# Patient Record
Sex: FEMALE | Race: WHITE
[De-identification: names, ages, dates, MRNs, and addresses within clinical notes are randomized per-mention and may not be internally consistent; named-entity substitution may affect disease eponyms.]

---

## 2019-10-28 ENCOUNTER — HOSPITAL ENCOUNTER (EMERGENCY)
Dept: HOSPITAL 88 - ER | Age: 69
Discharge: HOME | End: 2019-10-28
Payer: MEDICARE

## 2019-10-28 VITALS — HEIGHT: 68 IN | WEIGHT: 224 LBS | BODY MASS INDEX: 33.95 KG/M2

## 2019-10-28 DIAGNOSIS — R11.2: ICD-10-CM

## 2019-10-28 DIAGNOSIS — R19.7: ICD-10-CM

## 2019-10-28 DIAGNOSIS — N39.0: ICD-10-CM

## 2019-10-28 DIAGNOSIS — R10.84: Primary | ICD-10-CM

## 2019-10-28 LAB
ALBUMIN SERPL-MCNC: 3.8 G/DL (ref 3.5–5)
ALBUMIN/GLOB SERPL: 0.9 {RATIO} (ref 0.8–2)
ALP SERPL-CCNC: 95 IU/L (ref 40–150)
ALT SERPL-CCNC: 18 IU/L (ref 0–55)
ANION GAP SERPL CALC-SCNC: 14.4 MMOL/L (ref 8–16)
BACTERIA URNS QL MICRO: (no result) /HPF
BASOPHILS # BLD AUTO: 0 10*3/UL (ref 0–0.1)
BASOPHILS NFR BLD AUTO: 0.1 % (ref 0–1)
BILIRUB UR QL: NEGATIVE
BUN SERPL-MCNC: 42 MG/DL (ref 7–26)
BUN/CREAT SERPL: 20 (ref 6–25)
CALCIUM SERPL-MCNC: 8.9 MG/DL (ref 8.4–10.2)
CHLORIDE SERPL-SCNC: 104 MMOL/L (ref 98–107)
CK MB SERPL-MCNC: < 1 NG/ML (ref 0–4.3)
CK SERPL-CCNC: 150 IU/L (ref 29–168)
CLARITY UR: (no result)
CO2 SERPL-SCNC: 22 MMOL/L (ref 22–29)
COLOR UR: YELLOW
DEPRECATED NEUTROPHILS # BLD AUTO: 10.9 10*3/UL (ref 2.1–6.9)
DEPRECATED RBC URNS MANUAL-ACNC: (no result) /HPF (ref 0–5)
EGFRCR SERPLBLD CKD-EPI 2021: 23 ML/MIN (ref 60–?)
EOSINOPHIL # BLD AUTO: 0.1 10*3/UL (ref 0–0.4)
EOSINOPHIL NFR BLD AUTO: 0.6 % (ref 0–6)
EPI CELLS URNS QL MICRO: (no result) /LPF
ERYTHROCYTE [DISTWIDTH] IN CORD BLOOD: 12.5 % (ref 11.7–14.4)
GLOBULIN PLAS-MCNC: 4.1 G/DL (ref 2.3–3.5)
GLUCOSE SERPLBLD-MCNC: 203 MG/DL (ref 74–118)
HCT VFR BLD AUTO: 40.5 % (ref 34.2–44.1)
HGB BLD-MCNC: 13.2 G/DL (ref 12–16)
KETONES UR QL STRIP.AUTO: NEGATIVE
LEUKOCYTE ESTERASE UR QL STRIP.AUTO: (no result)
LYMPHOCYTES # BLD: 1.4 10*3/UL (ref 1–3.2)
LYMPHOCYTES NFR BLD AUTO: 10.9 % (ref 18–39.1)
MCH RBC QN AUTO: 30.5 PG (ref 28–32)
MCHC RBC AUTO-ENTMCNC: 32.6 G/DL (ref 31–35)
MCV RBC AUTO: 93.5 FL (ref 81–99)
MONOCYTES # BLD AUTO: 0.5 10*3/UL (ref 0.2–0.8)
MONOCYTES NFR BLD AUTO: 3.8 % (ref 4.4–11.3)
NEUTS SEG NFR BLD AUTO: 84.3 % (ref 38.7–80)
NITRITE UR QL STRIP.AUTO: NEGATIVE
PLATELET # BLD AUTO: 221 X10E3/UL (ref 140–360)
POTASSIUM SERPL-SCNC: 4.4 MMOL/L (ref 3.5–5.1)
PROT UR QL STRIP.AUTO: NEGATIVE
RBC # BLD AUTO: 4.33 X10E6/UL (ref 3.6–5.1)
SODIUM SERPL-SCNC: 136 MMOL/L (ref 136–145)
SP GR UR STRIP: 1.02 (ref 1.01–1.02)
UROBILINOGEN UR STRIP-MCNC: 0.2 MG/DL (ref 0.2–1)

## 2019-10-28 PROCEDURE — 84484 ASSAY OF TROPONIN QUANT: CPT

## 2019-10-28 PROCEDURE — 36415 COLL VENOUS BLD VENIPUNCTURE: CPT

## 2019-10-28 PROCEDURE — 74176 CT ABD & PELVIS W/O CONTRAST: CPT

## 2019-10-28 PROCEDURE — 82550 ASSAY OF CK (CPK): CPT

## 2019-10-28 PROCEDURE — 85025 COMPLETE CBC W/AUTO DIFF WBC: CPT

## 2019-10-28 PROCEDURE — 80053 COMPREHEN METABOLIC PANEL: CPT

## 2019-10-28 PROCEDURE — 81001 URINALYSIS AUTO W/SCOPE: CPT

## 2019-10-28 PROCEDURE — 82553 CREATINE MB FRACTION: CPT

## 2019-10-28 PROCEDURE — 99284 EMERGENCY DEPT VISIT MOD MDM: CPT

## 2019-10-28 NOTE — XMS REPORT
Patient Summary Document

                             Created on: 10/28/2019



SHARRONJOANIE

External Reference #: [id number]

: 1950

Sex: Female



Demographics







                          Address                   [address] [place]  [address]

 

                          Home Phone                [phone]

 

                          Preferred Language        Unknown

 

                          Marital Status            Unknown

 

                          Mormonism Affiliation     Unknown

 

                          Race                      Unknown

 

                          Ethnic Group              Unknown





Author







                          Author                    [facility]

 

                          Address                   Unknown

 

                          Phone                     Unavailable







Support







                Name            Relationship    Address         Phone

 

                    JOANIE MCDERMOTT     [employer]                 .

,   [address] [phone]

 

                    JOANIE MCDERMOTT     [employer]                 .

,   [address] [phone]







Care Team Providers







                    Care Team Member Name    Role                Phone

 

                          Unavailable               Unavailable







Payers







             Payer Name    Policy Type    Policy Number    Effective Date    Expiration Date







Problems

This patient has no known problems.



Allergies, Adverse Reactions, Alerts

This patient has no known allergies or adverse reactions.



Medications

This patient has no known medications.



Results







           Test Description    Test Time    Test Comments    Text Results    Atomic Results    Result

 Comments

 

                US THYROID ULTRASOUND    2018 14:25:02                    CLINICAL INDICATION:   E21.3  Hyperparathyroidism,

 unspecifiedTECHNIQUE:  Real time and doppler imaging of the thyroid is 
performed using a high frequency probe on the [employer].Comparis
on Study: Concurrent SPECT CT fusion examination.FINDINGS:Right lobe: 4.2 x 2.0 
x 1.5 cm.Left lobe:  3.2 x 1.5 x 1.2 cm.Isthmus:  2.0 mm.Comments: 0.9 x 0.8 cm 
nodule is noted at the left inferior thyroid.IMPRESSION:Left inferior thyroid 
0.9 x 0.8 cm nodule, most suspicious for a parathyroid adenoma in light of 
parathyroid SPECT CT fusion study.

## 2019-10-28 NOTE — DIAGNOSTIC IMAGING REPORT
EXAM: CT Abdomen and Pelvis WITHOUT contrast  

INDICATION: Abdominal pain, nausea, vomiting, diarrhea  

COMPARISON: None.

TECHNIQUE: Abdomen and pelvis were scanned utilizing a multidetector helical

scanner from the lung base to the pubic symphysis without administration of IV

contrast. Absence of intravenous contrast decreases sensitivity for detection

of focal lesions and vascular pathology. Coronal and sagittal reformations were

obtained. Routine protocol was performed. 

     IV CONTRAST: None

     ORAL CONTRAST: Gastro           

COMPLICATIONS: None 



RADIATION DOSE:

     Total DLP: 854 mGy*cm

     Estimated effective dose: (DLP x 0.015 x size factor) mSv

     CTDIvol has been reviewed. It is below the limits set by the Radiation

Protocol Committee (RPC).

     Dose modulation, iterative reconstruction, and/or weight based adjustment

of the mA/kV was utilized to reduce the radiation dose to as low as reasonably

achievable. 



FINDINGS:



LINES and TUBES: None.



LOWER THORAX:  Coronary stents.



HEPATOBILIARY:      No focal hepatic lesions. No biliary ductal dilation. 



GALLBLADDER: No radio-opaque stones or sludge.  No wall thickening.



SPLEEN: No splenomegaly. 



PANCREAS: No focal masses or ductal dilatation.  



ADRENALS: No adrenal nodules    



KIDNEYS/URETERS:  No hydronephrosis. No cystic or solid mass lesions.  No

stones. Mild bilateral renal parenchymal volume loss. Mild bilateral

perinephric fat stranding can be seen with renal insufficiency or senescence.



GI TRACT: No abnormal distention, wall thickening, or evidence of bowel

obstruction.       Appendix is normal.



PELVIC ORGANS/BLADDER: Tubal ligation clips.



LYMPH NODES: Slightly prominent mesenteric lymph nodes. No suspicious

lymphadenopathy.



VESSELS: There is moderate atherosclerotic disease in the aorta and major

arterial branches.



PERITONEUM / RETROPERITONEUM: Haziness of the mesenteric root. No free air or

fluid.



BONES: There are degenerative changes in the spine.



SOFT TISSUES: A 7.8 cm periumbilical fat-containing hernia through a 2.1 cm

peritoneal defect, without signs of strangulation.            



IMPRESSION: 



1.  Mild haziness of the mesenteric root fat and slightly prominent but

subcentimeter mesenteric lymph nodes is nonspecific but can be seen with

mesenteric panniculitis or potentially with a gastrointestinal

infectious/inflammatory process, however no direct evidence of gastrointestinal

inflammation.

2.  A 7.8 cm periumbilical fat-containing hernia through a 2.1 cm peritoneal

defect, without signs of strangulation.

3.  Coronary stents. 

4.  Bilateral renal atrophy.

5.  Advanced degenerative changes in the spine.



Signed by: Kris Bains DO on 10/28/2019 10:40 PM

## 2019-11-01 ENCOUNTER — HOSPITAL ENCOUNTER (OUTPATIENT)
Dept: HOSPITAL 88 - MRI | Age: 69
End: 2019-11-01
Attending: FAMILY MEDICINE
Payer: MEDICARE

## 2019-11-01 DIAGNOSIS — S33.5XXA: Primary | ICD-10-CM

## 2019-11-01 PROCEDURE — 72148 MRI LUMBAR SPINE W/O DYE: CPT

## 2019-11-04 NOTE — DIAGNOSTIC IMAGING REPORT
MRI SPINE LUMBAR WO



HISTORY: Lumbar sprain



COMPARISON:  CT of the abdomen and pelvis 10/28/2019



TECHNIQUE:

Sagittal T1, sagittal T2, sagittal STIR, coronal T2, and axial T2 weighted

images of the lumbar spine were obtained without contrast.  The patient was

unable to complete the examination (per technologist notes).



DISCUSSION:



Number of non-rib bearing lumbar vertebral bodies: 5.

Alignment: Normal lordosis.  No scoliosis.

Vertebrae: No fractures, or neoplasm.

Conus medullaris: Normal, ends at L1-L2

Cauda equina:  No masses or arachnoiditis.

Posterior paraspinal muscles: There is mild paraspinal muscle atrophy at the

lumbosacral junction.

Soft tissues: No signal abnormalities.



Mild to moderate multilevel disc degeneration is present. Nonspecific mild

multilevel inflammatory endplate changes are most prominent on the left at

L4-L5.



T12-L1: Disc bulge without significant canal or foraminal stenosis.



L1-L2: There is mild retrolisthesis of L1 on L2. Disc bulge without significant

canal or foraminal stenosis.



L2-L3: There is mild retrolisthesis of L2 on L3. Mild canal stenosis due to

disc bulge and ligamentum flavum thickening. Mild bilateral foraminal stenoses

due to disc bulge and facet arthrosis.



L3-L4: There is mild retrolisthesis of L3 on L4. Mild canal stenosis due to

disc bulge and ligamentum flavum thickening. Mild bilateral foraminal stenoses

due to disc bulge and facet arthrosis.



L4-L5: There is minimal grade 1 anterolisthesis of L4 on L5 due to facet

arthrosis. Mild canal stenosis due to uncovered disc bulge and ligamentum

flavum thickening. Mild right and mild to moderate left foraminal stenoses due

to uncovered disc bulge and facet arthrosis. The disc bulge may contact the

exiting left L4 nerve root. There is periarticular edema along the L4-L5 facet

joints, left greater than right.



L5-S1: Mild bilateral foraminal stenoses due to disc bulge and facet arthrosis.

No significant canal stenosis.



IMPRESSION:

1.  Mild to moderate multilevel disc degeneration. Nonspecific mild multilevel

inflammatory endplate changes, most prominent on the left at L4-L5. 

2.  Minimal grade 1 anterolisthesis of L4 on L5 due to facet arthrosis.

Suspected L4-L5 facet synovitis, left greater than right.

3.  Multilevel bilateral degenerative foraminal stenoses - mild to moderate on

the left at L4-L5.

4.  Mild degenerative canal stenoses from L2-L3 to L4-L5.



Signed by: Dr. Bruce Martinez M.D. on 11/4/2019 10:43 AM

## 2020-11-11 ENCOUNTER — HOSPITAL ENCOUNTER (OUTPATIENT)
Dept: HOSPITAL 88 - MRI | Age: 70
End: 2020-11-11
Attending: FAMILY MEDICINE
Payer: MEDICARE

## 2020-11-11 DIAGNOSIS — M47.816: Primary | ICD-10-CM

## 2020-11-11 PROCEDURE — 72148 MRI LUMBAR SPINE W/O DYE: CPT

## 2020-11-11 NOTE — DIAGNOSTIC IMAGING REPORT
Examination: MRI SPINE LUMBAR WO



History: ^DEGENERATIVE JOINT DISEASE OF LUMBAR

Comparison studies: Lumbar spine MRI performed November 1, 2019



Technique: 

Sagittal, coronal  and axial T2 , sagittal T1 and STIR; axial  spin density

oblique.



Findings:



Number of lumbar vertebral bodies: Five.



Alignment: Normal lordosis. No scoliosis.

Soft tissues: No T2 hyperintense inflammatory changes.

Posterior paraspinal soft tissues and muscles: No abnormality. 

Lower thoracic cord: Normal in signal and morphology. The tip of the conus is

at L1-L2.

Cauda equina: No masses.  No arachnoiditis.



Vertebrae: 

No fractures, infection or neoplasm.



Degenerative changes:

T12-L1:

Mild diffuse bulge.

No foraminal or canal stenosis.



L1-L2:

Mild retrolisthesis. Mild diffuse bulge.

No foraminal or canal stenosis.



L2-L3:

Mild retrolisthesis.

Mild diffuse disc bulge and bilateral facet arthropathy result in mild

bilateral neural foraminal narrowing and mild canal stenosis.



L3-L4:

Mild retrolisthesis.

Mild diffuse disc bulge, ligamentum flavum thickening and bilateral facet

arthropathy result in mild canal stenosis and mild bilateral neural foraminal

narrowing.



L4-L5:

Grade 1 anterolisthesis. No pars defect.

Uncovering of a diffuse disc bulge, ligamentum flavum thickening and bilateral

facet arthropathy result in mild right and moderate left neural foraminal

narrowing and mild canal stenosis.



L5-S1:

Mild diffuse disc bulge and facet arthropathy result in mild bilateral neural

foraminal narrowing.

No canal stenosis.



IMPRESSION: 



No new abnormality when compared to prior lumbar spine MRI from November 1, 2019.



Unchanged degenerative changes from T12-L1 through L5-S1 with moderate left

foraminal narrowing at L4-L5.



Degenerative grade 1 anterolisthesis of L4 on L5, unchanged.



Mild retrolisthesis from L1 through L4, unchanged.



Signed by: Dr. Vonnie Anderson M.D. on 11/11/2020 2:34 PM

## 2020-12-27 ENCOUNTER — HOSPITAL ENCOUNTER (EMERGENCY)
Dept: HOSPITAL 88 - ER | Age: 70
Discharge: HOME | End: 2020-12-27
Payer: MEDICARE

## 2020-12-27 VITALS — HEIGHT: 68 IN | BODY MASS INDEX: 33.95 KG/M2 | WEIGHT: 224 LBS

## 2020-12-27 VITALS — SYSTOLIC BLOOD PRESSURE: 119 MMHG | DIASTOLIC BLOOD PRESSURE: 63 MMHG

## 2020-12-27 DIAGNOSIS — Z95.5: ICD-10-CM

## 2020-12-27 DIAGNOSIS — E11.9: ICD-10-CM

## 2020-12-27 DIAGNOSIS — I10: ICD-10-CM

## 2020-12-27 DIAGNOSIS — J40: Primary | ICD-10-CM

## 2020-12-27 LAB
ALBUMIN SERPL-MCNC: 3.3 G/DL (ref 3.5–5)
ALBUMIN/GLOB SERPL: 1 {RATIO} (ref 0.8–2)
ALP SERPL-CCNC: 61 IU/L (ref 40–150)
ALT SERPL-CCNC: 20 IU/L (ref 0–55)
ANION GAP SERPL CALC-SCNC: 17 MMOL/L (ref 8–16)
BASOPHILS # BLD AUTO: 0 10*3/UL (ref 0–0.1)
BASOPHILS NFR BLD AUTO: 0.2 % (ref 0–1)
BUN SERPL-MCNC: 31 MG/DL (ref 7–26)
BUN/CREAT SERPL: 14 (ref 6–25)
CALCIUM SERPL-MCNC: 8.3 MG/DL (ref 8.4–10.2)
CHLORIDE SERPL-SCNC: 101 MMOL/L (ref 98–107)
CO2 SERPL-SCNC: 24 MMOL/L (ref 22–29)
DEPRECATED NEUTROPHILS # BLD AUTO: 2.6 10*3/UL (ref 2.1–6.9)
EGFRCR SERPLBLD CKD-EPI 2021: 22 ML/MIN (ref 60–?)
EOSINOPHIL # BLD AUTO: 0 10*3/UL (ref 0–0.4)
EOSINOPHIL NFR BLD AUTO: 0.7 % (ref 0–6)
ERYTHROCYTE [DISTWIDTH] IN CORD BLOOD: 13.2 % (ref 11.7–14.4)
GLOBULIN PLAS-MCNC: 3.3 G/DL (ref 2.3–3.5)
GLUCOSE SERPLBLD-MCNC: 117 MG/DL (ref 74–118)
HCT VFR BLD AUTO: 35.9 % (ref 34.2–44.1)
HGB BLD-MCNC: 11.9 G/DL (ref 12–16)
LYMPHOCYTES # BLD: 1.3 10*3/UL (ref 1–3.2)
LYMPHOCYTES NFR BLD AUTO: 30.6 % (ref 18–39.1)
MCH RBC QN AUTO: 30.5 PG (ref 28–32)
MCHC RBC AUTO-ENTMCNC: 33.1 G/DL (ref 31–35)
MCV RBC AUTO: 92.1 FL (ref 81–99)
MONOCYTES # BLD AUTO: 0.4 10*3/UL (ref 0.2–0.8)
MONOCYTES NFR BLD AUTO: 8.8 % (ref 4.4–11.3)
NEUTS SEG NFR BLD AUTO: 59.2 % (ref 38.7–80)
PLATELET # BLD AUTO: 131 X10E3/UL (ref 140–360)
POTASSIUM SERPL-SCNC: 4 MMOL/L (ref 3.5–5.1)
RBC # BLD AUTO: 3.9 X10E6/UL (ref 3.6–5.1)
SODIUM SERPL-SCNC: 138 MMOL/L (ref 136–145)

## 2020-12-27 PROCEDURE — 99283 EMERGENCY DEPT VISIT LOW MDM: CPT

## 2020-12-27 PROCEDURE — 80053 COMPREHEN METABOLIC PANEL: CPT

## 2020-12-27 PROCEDURE — 93005 ELECTROCARDIOGRAM TRACING: CPT

## 2020-12-27 PROCEDURE — 85025 COMPLETE CBC W/AUTO DIFF WBC: CPT

## 2020-12-27 PROCEDURE — 71045 X-RAY EXAM CHEST 1 VIEW: CPT

## 2020-12-27 PROCEDURE — 36415 COLL VENOUS BLD VENIPUNCTURE: CPT

## 2020-12-27 PROCEDURE — 83880 ASSAY OF NATRIURETIC PEPTIDE: CPT

## 2023-04-05 ENCOUNTER — HOSPITAL ENCOUNTER (EMERGENCY)
Dept: HOSPITAL 88 - ER | Age: 73
Discharge: HOME | End: 2023-04-05
Payer: MEDICARE

## 2023-04-05 VITALS — SYSTOLIC BLOOD PRESSURE: 135 MMHG | DIASTOLIC BLOOD PRESSURE: 78 MMHG

## 2023-04-05 VITALS — WEIGHT: 224 LBS | BODY MASS INDEX: 33.95 KG/M2 | HEIGHT: 68 IN
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
## 2023-04-05 DIAGNOSIS — S80.211A: ICD-10-CM

## 2023-04-05 DIAGNOSIS — I12.9: ICD-10-CM

## 2023-04-05 DIAGNOSIS — E11.22: ICD-10-CM
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
## 2023-04-05 DIAGNOSIS — R94.31: ICD-10-CM

## 2023-04-05 DIAGNOSIS — S00.83XA: Primary | ICD-10-CM

## 2023-04-05 DIAGNOSIS — N18.9: ICD-10-CM

## 2023-04-05 DIAGNOSIS — E11.65: ICD-10-CM
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
## 2023-04-05 LAB
ALBUMIN SERPL-MCNC: 3.6 G/DL (ref 3.5–5)
ALBUMIN/GLOB SERPL: 1.1 {RATIO} (ref 0.8–2)
ALP SERPL-CCNC: 69 IU/L (ref 40–150)
ALT SERPL-CCNC: 19 IU/L (ref 0–55)
ANION GAP SERPL CALC-SCNC: 16.4 MMOL/L (ref 8–16)
BASOPHILS # BLD AUTO: 0 10*3/UL (ref 0–0.1)
BASOPHILS NFR BLD AUTO: 0.5 % (ref 0–1)
BUN SERPL-MCNC: 49 MG/DL (ref 7–26)
BUN/CREAT SERPL: 26 (ref 6–25)
CALCIUM SERPL-MCNC: 9.5 MG/DL (ref 8.4–10.2)
CHLORIDE SERPL-SCNC: 102 MMOL/L (ref 98–107)
CK MB SERPL-MCNC: 1.3 NG/ML (ref 0–5)
CK SERPL-CCNC: 106 IU/L (ref 29–168)
CO2 SERPL-SCNC: 23 MMOL/L (ref 22–29)
DEPRECATED APTT PLAS QN: 27.7 SECONDS (ref 23.8–35.5)
DEPRECATED INR PLAS: 0.96
DEPRECATED NEUTROPHILS # BLD AUTO: 5.4 10*3/UL (ref 2.1–6.9)
EOSINOPHIL # BLD AUTO: 0.2 10*3/UL (ref 0–0.4)
EOSINOPHIL NFR BLD AUTO: 2.8 % (ref 0–6)
ERYTHROCYTE [DISTWIDTH] IN CORD BLOOD: 12.4 % (ref 11.7–14.4)
GLOBULIN PLAS-MCNC: 3.4 G/DL (ref 2.3–3.5)
GLUCOSE SERPLBLD-MCNC: 310 MG/DL (ref 74–118)
HCT VFR BLD AUTO: 34.6 % (ref 34.2–44.1)
HGB BLD-MCNC: 11.7 G/DL (ref 12–16)
LYMPHOCYTES # BLD: 1.9 10*3/UL (ref 1–3.2)
LYMPHOCYTES NFR BLD AUTO: 23.3 % (ref 18–39.1)
MCH RBC QN AUTO: 31.6 PG (ref 28–32)
MCHC RBC AUTO-ENTMCNC: 33.8 G/DL (ref 31–35)
MCV RBC AUTO: 93.5 FL (ref 81–99)
MONOCYTES # BLD AUTO: 0.5 10*3/UL (ref 0.2–0.8)
MONOCYTES NFR BLD AUTO: 5.8 % (ref 4.4–11.3)
NEUTS SEG NFR BLD AUTO: 67.2 % (ref 38.7–80)
PLATELET # BLD AUTO: 200 X10E3/UL (ref 140–360)
POTASSIUM SERPL-SCNC: 4.4 MMOL/L (ref 3.5–5.1)
PROTHROMBIN TIME: 13.3 SECONDS (ref 11.9–14.5)
RBC # BLD AUTO: 3.7 X10E6/UL (ref 3.6–5.1)
SODIUM SERPL-SCNC: 137 MMOL/L (ref 136–145)

## 2023-04-05 PROCEDURE — 99283 EMERGENCY DEPT VISIT LOW MDM: CPT

## 2023-04-05 PROCEDURE — 70450 CT HEAD/BRAIN W/O DYE: CPT

## 2023-04-05 PROCEDURE — 82550 ASSAY OF CK (CPK): CPT

## 2023-04-05 PROCEDURE — 84484 ASSAY OF TROPONIN QUANT: CPT

## 2023-04-05 PROCEDURE — 85610 PROTHROMBIN TIME: CPT

## 2023-04-05 PROCEDURE — 85025 COMPLETE CBC W/AUTO DIFF WBC: CPT

## 2023-04-05 PROCEDURE — 93005 ELECTROCARDIOGRAM TRACING: CPT

## 2023-04-05 PROCEDURE — 36415 COLL VENOUS BLD VENIPUNCTURE: CPT

## 2023-04-05 PROCEDURE — 82553 CREATINE MB FRACTION: CPT

## 2023-04-05 PROCEDURE — 85730 THROMBOPLASTIN TIME PARTIAL: CPT

## 2023-04-05 PROCEDURE — 80053 COMPREHEN METABOLIC PANEL: CPT

## 2023-04-05 PROCEDURE — 71046 X-RAY EXAM CHEST 2 VIEWS: CPT

## 2023-04-05 PROCEDURE — 72125 CT NECK SPINE W/O DYE: CPT

## 2023-04-19 ENCOUNTER — HOSPITAL ENCOUNTER (EMERGENCY)
Dept: HOSPITAL 88 - ER | Age: 73
Discharge: HOME | End: 2023-04-19
Payer: MEDICARE

## 2023-04-19 VITALS — WEIGHT: 218 LBS | BODY MASS INDEX: 33.04 KG/M2 | HEIGHT: 68 IN

## 2023-04-19 DIAGNOSIS — R94.31: ICD-10-CM

## 2023-04-19 DIAGNOSIS — K52.9: ICD-10-CM

## 2023-04-19 DIAGNOSIS — R11.2: Primary | ICD-10-CM

## 2023-04-19 LAB
ALBUMIN SERPL-MCNC: 3.6 G/DL (ref 3.5–5)
ALBUMIN/GLOB SERPL: 0.9 {RATIO} (ref 0.8–2)
ALP SERPL-CCNC: 70 IU/L (ref 40–150)
ALT SERPL-CCNC: 18 IU/L (ref 0–55)
ANION GAP SERPL CALC-SCNC: 13.3 MMOL/L (ref 8–16)
BASOPHILS # BLD AUTO: 0 10*3/UL (ref 0–0.1)
BASOPHILS NFR BLD AUTO: 0.4 % (ref 0–1)
BUN SERPL-MCNC: 35 MG/DL (ref 7–26)
BUN/CREAT SERPL: 22 (ref 6–25)
CALCIUM SERPL-MCNC: 9.6 MG/DL (ref 8.4–10.2)
CHLORIDE SERPL-SCNC: 106 MMOL/L (ref 98–107)
CO2 SERPL-SCNC: 27 MMOL/L (ref 22–29)
DEPRECATED NEUTROPHILS # BLD AUTO: 5 10*3/UL (ref 2.1–6.9)
EOSINOPHIL # BLD AUTO: 0.1 10*3/UL (ref 0–0.4)
EOSINOPHIL NFR BLD AUTO: 1.9 % (ref 0–6)
ERYTHROCYTE [DISTWIDTH] IN CORD BLOOD: 12.4 % (ref 11.7–14.4)
GLOBULIN PLAS-MCNC: 3.8 G/DL (ref 2.3–3.5)
GLUCOSE SERPLBLD-MCNC: 95 MG/DL (ref 74–118)
HCT VFR BLD AUTO: 36.7 % (ref 34.2–44.1)
HGB BLD-MCNC: 11.9 G/DL (ref 12–16)
LYMPHOCYTES # BLD: 1.7 10*3/UL (ref 1–3.2)
LYMPHOCYTES NFR BLD AUTO: 23.4 % (ref 18–39.1)
MCH RBC QN AUTO: 31.8 PG (ref 28–32)
MCHC RBC AUTO-ENTMCNC: 32.4 G/DL (ref 31–35)
MCV RBC AUTO: 98.1 FL (ref 81–99)
MONOCYTES # BLD AUTO: 0.5 10*3/UL (ref 0.2–0.8)
MONOCYTES NFR BLD AUTO: 6.5 % (ref 4.4–11.3)
NEUTS SEG NFR BLD AUTO: 67.5 % (ref 38.7–80)
PLATELET # BLD AUTO: 215 X10E3/UL (ref 140–360)
POTASSIUM SERPL-SCNC: 4.3 MMOL/L (ref 3.5–5.1)
RBC # BLD AUTO: 3.74 X10E6/UL (ref 3.6–5.1)
SODIUM SERPL-SCNC: 142 MMOL/L (ref 136–145)

## 2023-04-19 PROCEDURE — 93005 ELECTROCARDIOGRAM TRACING: CPT

## 2023-04-19 PROCEDURE — 85025 COMPLETE CBC W/AUTO DIFF WBC: CPT

## 2023-04-19 PROCEDURE — 99283 EMERGENCY DEPT VISIT LOW MDM: CPT

## 2023-04-19 PROCEDURE — 80053 COMPREHEN METABOLIC PANEL: CPT

## 2023-04-19 PROCEDURE — 36415 COLL VENOUS BLD VENIPUNCTURE: CPT

## 2023-04-19 PROCEDURE — 84484 ASSAY OF TROPONIN QUANT: CPT
